# Patient Record
Sex: FEMALE | Race: OTHER | HISPANIC OR LATINO | URBAN - METROPOLITAN AREA
[De-identification: names, ages, dates, MRNs, and addresses within clinical notes are randomized per-mention and may not be internally consistent; named-entity substitution may affect disease eponyms.]

---

## 2017-11-06 ENCOUNTER — EMERGENCY (EMERGENCY)
Facility: HOSPITAL | Age: 37
LOS: 1 days | Discharge: HOME | End: 2017-11-06
Attending: EMERGENCY MEDICINE
Payer: SELF-PAY

## 2017-11-06 VITALS
SYSTOLIC BLOOD PRESSURE: 125 MMHG | DIASTOLIC BLOOD PRESSURE: 79 MMHG | TEMPERATURE: 98 F | OXYGEN SATURATION: 99 % | HEART RATE: 64 BPM | RESPIRATION RATE: 18 BRPM

## 2017-11-06 VITALS — WEIGHT: 136.91 LBS

## 2017-11-06 LAB
APPEARANCE UR: CLEAR — SIGNIFICANT CHANGE UP
BILIRUB UR-MCNC: NEGATIVE — SIGNIFICANT CHANGE UP
COLOR SPEC: SIGNIFICANT CHANGE UP
DIFF PNL FLD: NEGATIVE — SIGNIFICANT CHANGE UP
EPI CELLS # UR: SIGNIFICANT CHANGE UP
GLUCOSE UR QL: NEGATIVE MG/DL — SIGNIFICANT CHANGE UP
HCG UR QL: NEGATIVE — SIGNIFICANT CHANGE UP
KETONES UR-MCNC: NEGATIVE — SIGNIFICANT CHANGE UP
LEUKOCYTE ESTERASE UR-ACNC: NEGATIVE — SIGNIFICANT CHANGE UP
NITRITE UR-MCNC: NEGATIVE — SIGNIFICANT CHANGE UP
PH UR: 6.5 — SIGNIFICANT CHANGE UP (ref 5–8)
PROT UR-MCNC: NEGATIVE MG/DL — SIGNIFICANT CHANGE UP
RBC CASTS # UR COMP ASSIST: SIGNIFICANT CHANGE UP /HPF (ref 0–4)
SP GR SPEC: 1 — LOW (ref 1.01–1.02)
UROBILINOGEN FLD QL: NEGATIVE MG/DL — SIGNIFICANT CHANGE UP
WBC UR QL: SIGNIFICANT CHANGE UP

## 2017-11-06 PROCEDURE — 81001 URINALYSIS AUTO W/SCOPE: CPT

## 2017-11-06 PROCEDURE — 81025 URINE PREGNANCY TEST: CPT

## 2017-11-06 PROCEDURE — 99283 EMERGENCY DEPT VISIT LOW MDM: CPT | Mod: 25

## 2017-11-06 PROCEDURE — 99284 EMERGENCY DEPT VISIT MOD MDM: CPT

## 2017-11-06 PROCEDURE — 96372 THER/PROPH/DIAG INJ SC/IM: CPT

## 2017-11-06 RX ORDER — METHOCARBAMOL 500 MG/1
1 TABLET, FILM COATED ORAL
Qty: 12 | Refills: 0 | OUTPATIENT
Start: 2017-11-06 | End: 2017-11-10

## 2017-11-06 RX ORDER — KETOROLAC TROMETHAMINE 30 MG/ML
30 SYRINGE (ML) INJECTION ONCE
Qty: 0 | Refills: 0 | Status: DISCONTINUED | OUTPATIENT
Start: 2017-11-06 | End: 2017-11-06

## 2017-11-06 RX ORDER — METHOCARBAMOL 500 MG/1
500 TABLET, FILM COATED ORAL ONCE
Qty: 0 | Refills: 0 | Status: COMPLETED | OUTPATIENT
Start: 2017-11-06 | End: 2017-11-06

## 2017-11-06 RX ORDER — IBUPROFEN 200 MG
1 TABLET ORAL
Qty: 20 | Refills: 0 | OUTPATIENT
Start: 2017-11-06 | End: 2017-11-11

## 2017-11-06 RX ADMIN — Medication 30 MILLIGRAM(S): at 19:22

## 2017-11-06 RX ADMIN — METHOCARBAMOL 500 MILLIGRAM(S): 500 TABLET, FILM COATED ORAL at 19:21

## 2017-11-06 NOTE — ED STATDOCS - ATTENDING CONTRIBUTION TO CARE
I, James Martínez, performed the initial face to face bedside interview with this patient regarding history of present illness, review of symptoms and relevant past medical, social and family history.  I completed an independent physical examination.  I was the initial provider who evaluated this patient. I have signed out the follow up of any pending tests (i.e. labs, radiological studies) to the ACP.  I have communicated the patient’s plan of care and disposition with the ACP.  The history, relevant review of systems, past medical and surgical history, medical decision making, and physical examination was documented by the scribe in my presence and I attest to the accuracy of the documentation.

## 2017-11-06 NOTE — ED STATDOCS - CARE PLAN
Principal Discharge DX:	Cystitis  Instructions for follow-up, activity and diet:	Followup with Medical clinic as discussed

## 2017-11-06 NOTE — ED ADULT NURSE NOTE - OBJECTIVE STATEMENT
Pt c/o lower abdominal pain with burning urination and frequency and pt states her urine has a strong smell.  Pt denies fevers/chills. States similar symptoms in past and was diagnosed with UTI. Pt seen & evaluated by MD waiting for test results.

## 2017-11-06 NOTE — ED STATDOCS - OBJECTIVE STATEMENT
36 y/o F presents to ED c/o mid L flank pain x3 days. Associated sx include vomiting, diarrhea (4 episodes), urinary frequency, nocturia and malodorous urine. Pt states she has had similar sx in the past, about 2-3 month ago where she was given abx. Denies fever, chills, sick contacts, rash or any other complaints at this time.

## 2017-11-06 NOTE — ED STATDOCS - PROGRESS NOTE DETAILS
Pt UA are within normal limits and she admits to urinary frequency and urgency. Pt denies any fever and D/C in stable condition. F/U with Medical clinic as discussed.

## 2018-05-21 ENCOUNTER — APPOINTMENT (OUTPATIENT)
Dept: ANTEPARTUM | Facility: CLINIC | Age: 38
End: 2018-05-21
Payer: SELF-PAY

## 2018-05-21 ENCOUNTER — ASOB RESULT (OUTPATIENT)
Age: 38
End: 2018-05-21

## 2018-05-21 PROBLEM — Z00.00 ENCOUNTER FOR PREVENTIVE HEALTH EXAMINATION: Status: ACTIVE | Noted: 2018-05-21

## 2018-05-21 PROCEDURE — 76857 US EXAM PELVIC LIMITED: CPT

## 2018-05-21 PROCEDURE — 76830 TRANSVAGINAL US NON-OB: CPT

## 2018-07-06 NOTE — ED ADULT TRIAGE NOTE - NS ED TRIAGE AVPU SCALE
Alert-The patient is alert, awake and responds to voice. The patient is oriented to time, place, and person. The triage nurse is able to obtain subjective information. (3) no apparent problem

## 2018-07-09 ENCOUNTER — EMERGENCY (EMERGENCY)
Facility: HOSPITAL | Age: 38
LOS: 1 days | Discharge: HOME | End: 2018-07-09
Attending: EMERGENCY MEDICINE
Payer: SELF-PAY

## 2018-07-09 VITALS — HEIGHT: 60.63 IN | WEIGHT: 145.95 LBS

## 2018-07-09 VITALS
DIASTOLIC BLOOD PRESSURE: 68 MMHG | TEMPERATURE: 98 F | HEART RATE: 67 BPM | SYSTOLIC BLOOD PRESSURE: 102 MMHG | RESPIRATION RATE: 18 BRPM | OXYGEN SATURATION: 99 %

## 2018-07-09 LAB
ALBUMIN SERPL ELPH-MCNC: 4.5 G/DL — SIGNIFICANT CHANGE UP (ref 3.3–5.2)
ALP SERPL-CCNC: 59 U/L — SIGNIFICANT CHANGE UP (ref 40–120)
ALT FLD-CCNC: 19 U/L — SIGNIFICANT CHANGE UP
ANION GAP SERPL CALC-SCNC: 14 MMOL/L — SIGNIFICANT CHANGE UP (ref 5–17)
APPEARANCE UR: CLEAR — SIGNIFICANT CHANGE UP
AST SERPL-CCNC: 21 U/L — SIGNIFICANT CHANGE UP
BILIRUB SERPL-MCNC: 0.4 MG/DL — SIGNIFICANT CHANGE UP (ref 0.4–2)
BILIRUB UR-MCNC: ABNORMAL
BUN SERPL-MCNC: 16 MG/DL — SIGNIFICANT CHANGE UP (ref 8–20)
CALCIUM SERPL-MCNC: 9.6 MG/DL — SIGNIFICANT CHANGE UP (ref 8.6–10.2)
CHLORIDE SERPL-SCNC: 100 MMOL/L — SIGNIFICANT CHANGE UP (ref 98–107)
CK SERPL-CCNC: 145 U/L — SIGNIFICANT CHANGE UP (ref 25–170)
CO2 SERPL-SCNC: 22 MMOL/L — SIGNIFICANT CHANGE UP (ref 22–29)
COLOR SPEC: YELLOW — SIGNIFICANT CHANGE UP
CREAT SERPL-MCNC: 0.64 MG/DL — SIGNIFICANT CHANGE UP (ref 0.5–1.3)
DIFF PNL FLD: ABNORMAL
EOSINOPHIL # BLD AUTO: 0 K/UL — SIGNIFICANT CHANGE UP (ref 0–0.5)
EOSINOPHIL NFR BLD AUTO: 0 % — SIGNIFICANT CHANGE UP (ref 0–6)
EPI CELLS # UR: SIGNIFICANT CHANGE UP
GLUCOSE SERPL-MCNC: 125 MG/DL — HIGH (ref 70–115)
GLUCOSE UR QL: NEGATIVE MG/DL — SIGNIFICANT CHANGE UP
HCG SERPL-ACNC: <5 MIU/ML — SIGNIFICANT CHANGE UP
HCT VFR BLD CALC: 37.5 % — SIGNIFICANT CHANGE UP (ref 37–47)
HGB BLD-MCNC: 12.9 G/DL — SIGNIFICANT CHANGE UP (ref 12–16)
KETONES UR-MCNC: NEGATIVE — SIGNIFICANT CHANGE UP
LEUKOCYTE ESTERASE UR-ACNC: ABNORMAL
LYMPHOCYTES # BLD AUTO: 0.5 K/UL — LOW (ref 1–4.8)
LYMPHOCYTES # BLD AUTO: 13.5 % — LOW (ref 20–55)
MAGNESIUM SERPL-MCNC: 2.3 MG/DL — SIGNIFICANT CHANGE UP (ref 1.6–2.6)
MCHC RBC-ENTMCNC: 29.1 PG — SIGNIFICANT CHANGE UP (ref 27–31)
MCHC RBC-ENTMCNC: 34.4 G/DL — SIGNIFICANT CHANGE UP (ref 32–36)
MCV RBC AUTO: 84.5 FL — SIGNIFICANT CHANGE UP (ref 81–99)
MONOCYTES # BLD AUTO: 0.2 K/UL — SIGNIFICANT CHANGE UP (ref 0–0.8)
MONOCYTES NFR BLD AUTO: 6.1 % — SIGNIFICANT CHANGE UP (ref 3–10)
NEUTROPHILS # BLD AUTO: 2.9 K/UL — SIGNIFICANT CHANGE UP (ref 1.8–8)
NEUTROPHILS NFR BLD AUTO: 79.8 % — HIGH (ref 37–73)
NITRITE UR-MCNC: NEGATIVE — SIGNIFICANT CHANGE UP
PH UR: 5 — SIGNIFICANT CHANGE UP (ref 5–8)
PLATELET # BLD AUTO: 232 K/UL — SIGNIFICANT CHANGE UP (ref 150–400)
POTASSIUM SERPL-MCNC: 3.5 MMOL/L — SIGNIFICANT CHANGE UP (ref 3.5–5.3)
POTASSIUM SERPL-SCNC: 3.5 MMOL/L — SIGNIFICANT CHANGE UP (ref 3.5–5.3)
PROT SERPL-MCNC: 8 G/DL — SIGNIFICANT CHANGE UP (ref 6.6–8.7)
PROT UR-MCNC: 30 MG/DL
RBC # BLD: 4.44 M/UL — SIGNIFICANT CHANGE UP (ref 4.4–5.2)
RBC # FLD: 13.2 % — SIGNIFICANT CHANGE UP (ref 11–15.6)
RBC CASTS # UR COMP ASSIST: ABNORMAL /HPF (ref 0–4)
SODIUM SERPL-SCNC: 136 MMOL/L — SIGNIFICANT CHANGE UP (ref 135–145)
SP GR SPEC: 1.02 — SIGNIFICANT CHANGE UP (ref 1.01–1.02)
TROPONIN T SERPL-MCNC: <0.01 NG/ML — SIGNIFICANT CHANGE UP (ref 0–0.06)
UROBILINOGEN FLD QL: 1 MG/DL
WBC # BLD: 3.6 K/UL — LOW (ref 4.8–10.8)
WBC # FLD AUTO: 3.6 K/UL — LOW (ref 4.8–10.8)
WBC UR QL: SIGNIFICANT CHANGE UP

## 2018-07-09 PROCEDURE — 71045 X-RAY EXAM CHEST 1 VIEW: CPT | Mod: 26

## 2018-07-09 PROCEDURE — 80053 COMPREHEN METABOLIC PANEL: CPT

## 2018-07-09 PROCEDURE — 82550 ASSAY OF CK (CPK): CPT

## 2018-07-09 PROCEDURE — 36415 COLL VENOUS BLD VENIPUNCTURE: CPT

## 2018-07-09 PROCEDURE — 83735 ASSAY OF MAGNESIUM: CPT

## 2018-07-09 PROCEDURE — 99284 EMERGENCY DEPT VISIT MOD MDM: CPT | Mod: 25

## 2018-07-09 PROCEDURE — 99283 EMERGENCY DEPT VISIT LOW MDM: CPT

## 2018-07-09 PROCEDURE — 85027 COMPLETE CBC AUTOMATED: CPT

## 2018-07-09 PROCEDURE — T1013: CPT

## 2018-07-09 PROCEDURE — 71045 X-RAY EXAM CHEST 1 VIEW: CPT

## 2018-07-09 PROCEDURE — 84702 CHORIONIC GONADOTROPIN TEST: CPT

## 2018-07-09 PROCEDURE — 81001 URINALYSIS AUTO W/SCOPE: CPT

## 2018-07-09 PROCEDURE — 84484 ASSAY OF TROPONIN QUANT: CPT

## 2018-07-09 PROCEDURE — 99053 MED SERV 10PM-8AM 24 HR FAC: CPT

## 2018-07-09 RX ORDER — SODIUM CHLORIDE 9 MG/ML
2000 INJECTION INTRAMUSCULAR; INTRAVENOUS; SUBCUTANEOUS ONCE
Qty: 0 | Refills: 0 | Status: COMPLETED | OUTPATIENT
Start: 2018-07-09 | End: 2018-07-09

## 2018-07-09 RX ADMIN — SODIUM CHLORIDE 2000 MILLILITER(S): 9 INJECTION INTRAMUSCULAR; INTRAVENOUS; SUBCUTANEOUS at 10:34

## 2018-07-09 NOTE — ED ADULT TRIAGE NOTE - CHIEF COMPLAINT QUOTE
"I was at work and I passed out 3 times. " Pt states she hit her head first time, but not 2nd or 3rd time. Pt  had a Gatorade today but did not eat. LMP was 6/28/18

## 2018-07-09 NOTE — ED PROVIDER NOTE - OBJECTIVE STATEMENT
38 year old female with no significant PMh presents with syncope. pt reports that yesterday she was very nauseous, vomited x 1 and had diarrhea, and had poor PO intake. This morning she only drank some gatorade, but did not eat anything. 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 She states that she was caught by a co-worker and was lowered to the floor and did not hit her head. She states that she was unconscious for several minutes, no convulsions. She then went back to work, and states that she had two more episodes in which she felt lightheaded and like she was going to pass out, but did not lose consciousness a second time. No chest pain, palpitations, abd pain, vaginal bleeding, dysuria, HA, numbness, tingling, weakness.

## 2018-07-09 NOTE — ED PROVIDER NOTE - MEDICAL DECISION MAKING DETAILS
Pt states that she feels much ebtter and she tolerated PO here. Bilirubin in urine, yet LFTs and bilirubin in serum are normal and abd is completely soft and non-tender. Syncope was likely secondary to hypovolemia from AGE yesterday. Stable for dc

## 2020-06-26 ENCOUNTER — APPOINTMENT (OUTPATIENT)
Dept: MATERNAL FETAL MEDICINE | Facility: CLINIC | Age: 40
End: 2020-06-26

## 2020-06-26 ENCOUNTER — APPOINTMENT (OUTPATIENT)
Dept: ANTEPARTUM | Facility: CLINIC | Age: 40
End: 2020-06-26

## 2020-06-26 VITALS
RESPIRATION RATE: 18 BRPM | OXYGEN SATURATION: 98 % | SYSTOLIC BLOOD PRESSURE: 122 MMHG | WEIGHT: 159 LBS | HEIGHT: 62 IN | HEART RATE: 91 BPM | DIASTOLIC BLOOD PRESSURE: 74 MMHG | BODY MASS INDEX: 29.26 KG/M2

## 2020-06-26 DIAGNOSIS — Z98.51 TUBAL LIGATION STATUS: ICD-10-CM

## 2020-06-26 DIAGNOSIS — O09.529 SUPERVISION OF ELDERLY MULTIGRAVIDA, UNSPECIFIED TRIMESTER: ICD-10-CM

## 2020-06-26 DIAGNOSIS — Z31.69 ENCOUNTER FOR OTHER GENERAL COUNSELING AND ADVICE ON PROCREATION: ICD-10-CM

## 2021-09-08 NOTE — ED PROVIDER NOTE - CPE EDP HEME LYMPH NORM
Over the last 2 weeks, how often have you been bothered by the following problems?          PHQ2 Score: 0  PHQ2 Score Interpretation: No further screening needed  1. Little interest or pleasure in activity?: 0  2. Feeling down, depressed, or hopeless?: 0               CC:  Theresa J Schwabe is here today for Follow-up   on her recent Covid and XR, had her XR done yesterday     Medications have been reviewed and updated    Patient preferred phone number: [phone]  Ok to leave detailed message on [employer]    Health Maintenance Due   Topic Date Due   • COVID-19 Vaccine (1) Never done   • Shingles Vaccine (1 of 2) Never done   • Colorectal Cancer Screen-  Never done   • Influenza Vaccine (1) 09/01/2021       Patient is due for topics as listed above but is not proceeding with Immunization(s) COVID-19, Influenza and Shingles and Colorectal Cancer Screening: Colonoscopy at this time.       [facility] [address] [address] [employer] - [place] [place] - [address]  [address]  [place] [address]  Phone: [phone] Fax: [phone]       normal...

## 2021-09-24 ENCOUNTER — APPOINTMENT (OUTPATIENT)
Dept: ORTHOPEDIC SURGERY | Facility: CLINIC | Age: 41
End: 2021-09-24
Payer: MEDICAID

## 2021-09-24 VITALS
HEART RATE: 67 BPM | HEIGHT: 62 IN | DIASTOLIC BLOOD PRESSURE: 81 MMHG | WEIGHT: 159 LBS | SYSTOLIC BLOOD PRESSURE: 125 MMHG | BODY MASS INDEX: 29.26 KG/M2

## 2021-09-24 DIAGNOSIS — M47.816 SPONDYLOSIS W/OUT MYELOPATHY OR RADICULOPATHY, LUMBAR REGION: ICD-10-CM

## 2021-09-24 DIAGNOSIS — M06.9 RHEUMATOID ARTHRITIS, UNSPECIFIED: ICD-10-CM

## 2021-09-24 DIAGNOSIS — I10 ESSENTIAL (PRIMARY) HYPERTENSION: ICD-10-CM

## 2021-09-24 DIAGNOSIS — M51.36 OTHER INTERVERTEBRAL DISC DEGENERATION, LUMBAR REGION: ICD-10-CM

## 2021-09-24 PROCEDURE — 99072 ADDL SUPL MATRL&STAF TM PHE: CPT

## 2021-09-24 PROCEDURE — 72100 X-RAY EXAM L-S SPINE 2/3 VWS: CPT

## 2021-09-24 PROCEDURE — 99204 OFFICE O/P NEW MOD 45 MIN: CPT

## 2021-09-24 RX ORDER — MELOXICAM 15 MG/1
15 TABLET ORAL
Qty: 30 | Refills: 1 | Status: ACTIVE | COMMUNITY
Start: 2021-09-24 | End: 1900-01-01

## 2021-09-24 NOTE — HISTORY OF PRESENT ILLNESS
[de-identified] : 41 year old female with right sided low back pain intermittent x years but the last 2 months has been constant and more bothersome.  Spasms or quality, worse after a long day at work when she is standing for a long period time. Is not radiating down the legs and there is no change in bowel bladder, nolower extremity weakness.She has had one pain management injection did not help to relieve her pain but she has not had an physical therapy to date. Pain 7/10 on a regular basis, 5/10 after resting and taking ibuprofen.\par Past medical history is relevant for rheumatoid arthritis and well-controlled hypertension. She does not smoke. 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

## 2021-09-24 NOTE — DISCUSSION/SUMMARY
[Surgical risks reviewed] : Surgical risks reviewed [de-identified] : Physical therapy for decreased pain and increased function. Rheumatology consult polyarthralgias, local treatment of rheumatoid arthritis.She will bring the actual study of her recent MRI of her lumbar spine so that we  can give appropriate advice regarding the findings. medrol Dosepak for anti-inflammatory properties be followed by meloxicam 50 mg once ap.r.n. pain.Anticipatory guidance regarding degenerative disc disease, lumbar spondylosis was given and discussed the natural history of such with her at length. Proper lifting and bending.  possible custom orthotics as she does have a mild leg length discrepancy maybe helpful in decreasing her myositis-type back pain after long days standing at work.She will follow up in 4-6 weeks or p.r.n.with a copy of the CT of her recent lumbar MRI appear

## 2021-09-24 NOTE — PHYSICAL EXAM
[de-identified] : CONSTITUTIONAL: The patient is a very pleasant individual who is well-nourished and who appears stated age.\par PSYCHIATRIC: The patient is alert and oriented X 3 and in no apparent distress, and participates with orthopedic evaluation well.\par HEAD: Atraumatic and is nonsyndromic in appearance.\par EENT: No visible thyromegaly, EOMI.\par RESPIRATORY: Respiratory rate is regular, not dyspneic on examination.\par LYMPHATICS: There is no inguinal lymphadenopathy\par INTEGUMENTARY: Skin is clean, dry, and intact about the bilateral lower extremities and lumbar spine.\par VASCULAR: There is brisk capillary refill about the bilateral lower extremities.\par NEUROLOGIC: There are no pathologic reflexes. There is no decrease in sensation of the bilateral lower extremities on Wartenberg pinwheel/manual examination. Deep tendon reflexes are well maintained at 2+/4 of the bilateral lower extremities and are symmetric..\par MUSCULOSKELETAL: There is no visible muscular atrophy. Manual motor strength is well maintained in the bilateral lower extremities. Range of motion of lumbar spine is well maintained. The patient ambulates in a non-myelopathic manner. Negative tension sign and straight leg raise bilaterally. Quad extension, ankle dorsiflexion, EHL, plantar flexion, and ankle eversion are well preserved. Normal secondary orthopaedic exam of bilateral hips, greater trochanteric area, knees and ankles\par Positive reproducible right lumbar myositis [de-identified] : X-rays of the lumbar spine are reviewed at today's datethe lower lumbar degenerative disc disease as well as a transitional segment\par \par  she had an MRI at a private [facility]orthopedic office in [place] 3 weeks ago. We did look for her MRI in every radiology suite the we have access to but we do not have access to her MRI. She states she will bring about 90 visit.

## 2023-06-14 ENCOUNTER — EMERGENCY (EMERGENCY)
Facility: HOSPITAL | Age: 43
LOS: 1 days | Discharge: HOME | End: 2023-06-14
Attending: EMERGENCY MEDICINE
Payer: COMMERCIAL

## 2023-06-14 VITALS
HEART RATE: 67 BPM | DIASTOLIC BLOOD PRESSURE: 84 MMHG | RESPIRATION RATE: 17 BRPM | TEMPERATURE: 98 F | OXYGEN SATURATION: 97 % | SYSTOLIC BLOOD PRESSURE: 114 MMHG

## 2023-06-14 VITALS
HEART RATE: 66 BPM | TEMPERATURE: 98 F | SYSTOLIC BLOOD PRESSURE: 128 MMHG | WEIGHT: 139.99 LBS | OXYGEN SATURATION: 98 % | DIASTOLIC BLOOD PRESSURE: 81 MMHG | HEIGHT: 61 IN | RESPIRATION RATE: 20 BRPM

## 2023-06-14 LAB
ALBUMIN SERPL ELPH-MCNC: 4.6 G/DL — SIGNIFICANT CHANGE UP (ref 3.3–5.2)
ALP SERPL-CCNC: 55 U/L — SIGNIFICANT CHANGE UP (ref 40–120)
ALT FLD-CCNC: 13 U/L — SIGNIFICANT CHANGE UP
ANION GAP SERPL CALC-SCNC: 12 MMOL/L — SIGNIFICANT CHANGE UP (ref 5–17)
APTT BLD: 27 SEC — LOW (ref 27.5–35.5)
AST SERPL-CCNC: 19 U/L — SIGNIFICANT CHANGE UP
BASOPHILS # BLD AUTO: 0.02 K/UL — SIGNIFICANT CHANGE UP (ref 0–0.2)
BASOPHILS NFR BLD AUTO: 0.4 % — SIGNIFICANT CHANGE UP (ref 0–2)
BILIRUB SERPL-MCNC: <0.2 MG/DL — LOW (ref 0.4–2)
BUN SERPL-MCNC: 14 MG/DL — SIGNIFICANT CHANGE UP (ref 8–20)
CALCIUM SERPL-MCNC: 9 MG/DL — SIGNIFICANT CHANGE UP (ref 8.4–10.5)
CHLORIDE SERPL-SCNC: 105 MMOL/L — SIGNIFICANT CHANGE UP (ref 96–108)
CO2 SERPL-SCNC: 22 MMOL/L — SIGNIFICANT CHANGE UP (ref 22–29)
CREAT SERPL-MCNC: 0.57 MG/DL — SIGNIFICANT CHANGE UP (ref 0.5–1.3)
EGFR: 114 ML/MIN/1.73M2 — SIGNIFICANT CHANGE UP
EOSINOPHIL # BLD AUTO: 0.06 K/UL — SIGNIFICANT CHANGE UP (ref 0–0.5)
EOSINOPHIL NFR BLD AUTO: 1.2 % — SIGNIFICANT CHANGE UP (ref 0–6)
GLUCOSE SERPL-MCNC: 82 MG/DL — SIGNIFICANT CHANGE UP (ref 70–99)
HCG SERPL-ACNC: <4 MIU/ML — SIGNIFICANT CHANGE UP
HCT VFR BLD CALC: 34.2 % — LOW (ref 34.5–45)
HGB BLD-MCNC: 11.6 G/DL — SIGNIFICANT CHANGE UP (ref 11.5–15.5)
IMM GRANULOCYTES NFR BLD AUTO: 0.4 % — SIGNIFICANT CHANGE UP (ref 0–0.9)
INR BLD: 1.02 RATIO — SIGNIFICANT CHANGE UP (ref 0.88–1.16)
LYMPHOCYTES # BLD AUTO: 1.96 K/UL — SIGNIFICANT CHANGE UP (ref 1–3.3)
LYMPHOCYTES # BLD AUTO: 38 % — SIGNIFICANT CHANGE UP (ref 13–44)
MAGNESIUM SERPL-MCNC: 2.5 MG/DL — SIGNIFICANT CHANGE UP (ref 1.8–2.6)
MCHC RBC-ENTMCNC: 28.6 PG — SIGNIFICANT CHANGE UP (ref 27–34)
MCHC RBC-ENTMCNC: 33.9 GM/DL — SIGNIFICANT CHANGE UP (ref 32–36)
MCV RBC AUTO: 84.4 FL — SIGNIFICANT CHANGE UP (ref 80–100)
MONOCYTES # BLD AUTO: 0.34 K/UL — SIGNIFICANT CHANGE UP (ref 0–0.9)
MONOCYTES NFR BLD AUTO: 6.6 % — SIGNIFICANT CHANGE UP (ref 2–14)
NEUTROPHILS # BLD AUTO: 2.76 K/UL — SIGNIFICANT CHANGE UP (ref 1.8–7.4)
NEUTROPHILS NFR BLD AUTO: 53.4 % — SIGNIFICANT CHANGE UP (ref 43–77)
PLATELET # BLD AUTO: 270 K/UL — SIGNIFICANT CHANGE UP (ref 150–400)
POTASSIUM SERPL-MCNC: 3.6 MMOL/L — SIGNIFICANT CHANGE UP (ref 3.5–5.3)
POTASSIUM SERPL-SCNC: 3.6 MMOL/L — SIGNIFICANT CHANGE UP (ref 3.5–5.3)
PROT SERPL-MCNC: 7.2 G/DL — SIGNIFICANT CHANGE UP (ref 6.6–8.7)
PROTHROM AB SERPL-ACNC: 11.8 SEC — SIGNIFICANT CHANGE UP (ref 10.5–13.4)
RBC # BLD: 4.05 M/UL — SIGNIFICANT CHANGE UP (ref 3.8–5.2)
RBC # FLD: 13.2 % — SIGNIFICANT CHANGE UP (ref 10.3–14.5)
SODIUM SERPL-SCNC: 138 MMOL/L — SIGNIFICANT CHANGE UP (ref 135–145)
TROPONIN T SERPL-MCNC: <0.01 NG/ML — SIGNIFICANT CHANGE UP (ref 0–0.06)
WBC # BLD: 5.16 K/UL — SIGNIFICANT CHANGE UP (ref 3.8–10.5)
WBC # FLD AUTO: 5.16 K/UL — SIGNIFICANT CHANGE UP (ref 3.8–10.5)

## 2023-06-14 PROCEDURE — 99223 1ST HOSP IP/OBS HIGH 75: CPT

## 2023-06-14 PROCEDURE — 70450 CT HEAD/BRAIN W/O DYE: CPT | Mod: 26,MA

## 2023-06-14 PROCEDURE — 70551 MRI BRAIN STEM W/O DYE: CPT | Mod: 26,MA

## 2023-06-14 PROCEDURE — 71046 X-RAY EXAM CHEST 2 VIEWS: CPT | Mod: 26

## 2023-06-14 PROCEDURE — 93010 ELECTROCARDIOGRAM REPORT: CPT

## 2023-06-14 PROCEDURE — 72125 CT NECK SPINE W/O DYE: CPT | Mod: 26,MA

## 2023-06-14 NOTE — ED CDU PROVIDER INITIAL DAY NOTE - CLINICAL SUMMARY MEDICAL DECISION MAKING FREE TEXT BOX
42yo female with no pmh presents with LUE weakness. Pt states that since Sunday with LUE weakness and paresthesias to left face x 3 days . with tingling to the left 3,4th and 5th finger .  pt states she feels her arm is weak when she tries to pick something up. Pt denies paresthesias/numbness/pain to arm states it's weak when trying to use it. Pt denies fevers/chills, ha, loc, cp/sob/palp, cough, abd pain/n/v/d, urinary symptoms, recent travel and sick contacts. likely radiculopathy less likely stroke    MRI brain  stroke protocol   neurocheck Q4 hrs

## 2023-06-14 NOTE — ED ADULT NURSE NOTE - NSFALLUNIVINTERV_ED_ALL_ED
Bed/Stretcher in lowest position, wheels locked, appropriate side rails in place/Call bell, personal items and telephone in reach/Instruct patient to call for assistance before getting out of bed/chair/stretcher/Non-slip footwear applied when patient is off stretcher/[place] to call system/Physically safe environment - no spills, clutter or unnecessary equipment/Purposeful proactive rounding/Room/bathroom lighting operational, light cord in reach

## 2023-06-14 NOTE — ED CDU PROVIDER INITIAL DAY NOTE - ATTENDING APP SHARED VISIT CONTRIBUTION OF CARE
I agree with the PA's note and was available for any issues/concerns. I was directly involved in patient care. My brief overall assessment is as follows:    43 year old female no PMHx c/o LUE weakness; initial work up reviewed; admit to obs for MRI

## 2023-06-14 NOTE — ED ADULT NURSE REASSESSMENT NOTE - NS ED NURSE REASSESS COMMENT FT1
Assumed care of pt at 19:15 as stated in report from VENECIA ansari. Charting as noted. Patient A&O x4, denies pain/discomfort, denies CP/SOB. Updated on the plan of care. Call bell within reach, bed locked in lowest position. IV site flushed w/ NS. No redness, swelling or pain noted to site. No signs of acute distress noted, safety maintained. CT pending.
care assumed from VENECIA Stewart. patient to MRI. awaiting . will examine when patient returns from MRI.
care assumed from VENECIA Stewart. patient AAO x 4, GCS 15, NIH 0. on obs pending MRI result. patient back from MRI.  Arnaud used to speak with patient. patient endorses some decreased sensation to fingers, but better than before.. tele monitoring in use. bed in lowest position, call bell within reach.

## 2023-06-14 NOTE — ED PROVIDER NOTE - CLINICAL SUMMARY MEDICAL DECISION MAKING FREE TEXT BOX
42yo female with no pmh presents with LUE weakness. 42yo female with no pmh presents with LUE weakness. Pt with no acute findings on CT, will keep in obs for MRI

## 2023-06-14 NOTE — ED CDU PROVIDER INITIAL DAY NOTE - OBJECTIVE STATEMENT
44yo female with no pmh presents with LUE weakness. Pt states that since Sunday with LUE weakness and paresthesias to left face x 3 days . with tingling to the left 3,4th and 5th finger .  pt states she feels her arm is weak when she tries to pick something up. Pt denies paresthesias/numbness/pain to arm states it's weak when trying to use it. Pt denies fevers/chills, ha, loc, cp/sob/palp, cough, abd pain/n/v/d, urinary symptoms, recent travel and sick contacts.

## 2023-06-14 NOTE — ED PROVIDER NOTE - PHYSICAL EXAMINATION
Const: Awake, alert and oriented. In no acute distress. Well appearing.  HEENT: NC/AT. Moist mucous membranes.  Eyes: No scleral icterus. EOMI.  Neck:. Soft and supple. Full ROM without pain.  Cardiac: Regular rate and regular rhythm. +S1/S2. Peripheral pulses 2+ and symmetric. No LE edema.  Resp: Speaking in full sentences. No evidence of respiratory distress. No wheezes, rales or rhonchi.  Abd: Soft, non-tender, non-distended. Normal bowel sounds in all 4 quadrants. No guarding or rebound.  Back: Spine midline and non-tender. No CVAT.  Skin: No rashes, abrasions or lacerations.  Lymph: No cervical lymphadenopathy.  Neuro: A&Ox3, moving all extremities symmetrically, follows commands, motor geo upper and lower ext 5/5, sensory symm and intact CN 2-12 grossly intact, no ataxia, no nystagmus, no dysmetria, no ddk, symm geo, no pronator drift

## 2023-06-14 NOTE — ED ADULT NURSE NOTE - OBJECTIVE STATEMENT
pt reports to the ED c/o left facial pain along with numbness and tingling to left extremity. pt states symptoms started on sunday. decreased sensation and strength to left side. no facial droop noted. pt reports blurry vision in both eyes. pt reports 7/10 pain on 1-10 pain rating scale. IV placed, labs sent, placed on cardiac monitor.

## 2023-06-14 NOTE — ED PROVIDER NOTE - OBJECTIVE STATEMENT
42yo female with no pmh presents with LUE weakness. Pt states that since Sunday with LUE weakness and paresthesias to left face. pt states she feels her arm is weak when she tries to pick something up. Pt denies paresthesias/numbness/pain to arm states it's weak when trying to use it. Pt denies fevers/chills, ha, loc, cp/sob/palp, cough, abd pain/n/v/d, urinary symptoms, recent travel and sick contacts.

## 2023-06-14 NOTE — ED ADULT NURSE REASSESSMENT NOTE - NSFALLUNIVINTERV_ED_ALL_ED
Bed/Stretcher in lowest position, wheels locked, appropriate side rails in place/Call bell, personal items and telephone in reach/Instruct patient to call for assistance before getting out of bed/chair/stretcher/Non-slip footwear applied when patient is off stretcher/[place] to call system/Physically safe environment - no spills, clutter or unnecessary equipment/Purposeful proactive rounding/Room/bathroom lighting operational, light cord in reach

## 2023-06-14 NOTE — ED CDU PROVIDER INITIAL DAY NOTE - PHYSICAL EXAMINATION
Const: Awake, alert and oriented. In no acute distress. Well appearing.  HEENT: NC/AT. Moist mucous membranes.  Eyes: No scleral icterus. EOMI.  Neck:. Soft and supple. Full ROM without pain.  Cardiac: Regular rate and regular rhythm. +S1/S2.. No LE edema.  Resp: Speaking in full sentences. No evidence of respiratory distress. No wheezes, rales or rhonchi.  Abd: Soft, non-tender, non-distended. Normal bowel sounds in all 4 quadrants. No guarding or rebound.  Back: Spine midline and non-tender. No CVAT.  Skin: No rashes, abrasions or lacerations.  Neuro: A&Ox3, moving all extremities symmetrically, follows commands, motor geo upper and lower ext 5/5, sensory symm and intact CN 2-12 grossly intact, no ataxia, no nystagmus, no dysmetria, no ddk, symm geo, no pronator drift

## 2023-06-15 PROCEDURE — 70450 CT HEAD/BRAIN W/O DYE: CPT | Mod: MA

## 2023-06-15 PROCEDURE — 82962 GLUCOSE BLOOD TEST: CPT

## 2023-06-15 PROCEDURE — 72125 CT NECK SPINE W/O DYE: CPT | Mod: MA

## 2023-06-15 PROCEDURE — 85025 COMPLETE CBC W/AUTO DIFF WBC: CPT

## 2023-06-15 PROCEDURE — 71046 X-RAY EXAM CHEST 2 VIEWS: CPT

## 2023-06-15 PROCEDURE — 85730 THROMBOPLASTIN TIME PARTIAL: CPT

## 2023-06-15 PROCEDURE — G0378: CPT

## 2023-06-15 PROCEDURE — 99285 EMERGENCY DEPT VISIT HI MDM: CPT | Mod: 25

## 2023-06-15 PROCEDURE — 84484 ASSAY OF TROPONIN QUANT: CPT

## 2023-06-15 PROCEDURE — 80053 COMPREHEN METABOLIC PANEL: CPT

## 2023-06-15 PROCEDURE — 70551 MRI BRAIN STEM W/O DYE: CPT | Mod: MA

## 2023-06-15 PROCEDURE — 83735 ASSAY OF MAGNESIUM: CPT

## 2023-06-15 PROCEDURE — 36415 COLL VENOUS BLD VENIPUNCTURE: CPT

## 2023-06-15 PROCEDURE — 84702 CHORIONIC GONADOTROPIN TEST: CPT

## 2023-06-15 PROCEDURE — 85610 PROTHROMBIN TIME: CPT

## 2023-06-15 PROCEDURE — T1013: CPT

## 2023-06-15 PROCEDURE — 93005 ELECTROCARDIOGRAM TRACING: CPT

## 2023-06-15 NOTE — ED CDU PROVIDER DISPOSITION NOTE - NSFOLLOWUPINSTRUCTIONS_ED_ALL_ED_FT
[place] Motrin sin receta según lo necesite para el dolor.  Llame y delicia un seguimiento con atención primaria y neurología nand también  Radiculopatía cervical  Cervical Radiculopathy   La radiculopatía cervical quiere decir que un nervio del cherrie (nervio cervical) está comprimido o dañado. Lemitar puede ocurrir debido a kena lesión en la columna vertebral cervical (vértebras) del cherrie, o neema parte normal del envejecimiento. Lemitar puede causar dolor o pérdida de la sensibilidad (adormecimiento) que comienza en el cherrie y va hasta el brazo y los dedos. Con frecuencia, esta afección mejora con reposo. Augusto vez sea necesario administrar un tratamiento si la afección no mejora.  ¿Cuáles son las causas?  Lesión en el cherrie.  Abombamiento discal en la columna vertebral.  Movimientos musculares que no puede controlar (espasmos musculares).  Tensión de los músculos del cherrie debido al uso excesivo.  Artritis.  Fractura de los huesos y las articulaciones de la columna (espondiloartrosis) debido al envejecimiento.  Espolones óseos que se mesfin cerca de los nervios del cherrie.    ¿Cuáles son los signos o los síntomas?  Dolor. El dolor puede tener las siguientes características:    Va desde el cherrie hasta el brazo y la mano.  Es muy intenso o irritante.  Empeora al  el cherrie.  Pérdida de la sensibilidad o adormecimiento en el brazo o la mano.  Debilidad en el brazo o la mano, en casos muy graves.    ¿Cómo se trata?  En muchos casos, no se requiere tratamiento para esta afección. Con reposo, esta generalmente mejora con el tiempo. Si es necesario administrar tratamiento, las opciones pueden incluir lo siguiente:    El uso de un collarín cervical blando cely períodos cortos, neema se lo haya indicado el médico.  Realizar ejercicios (fisioterapia) para fortalecer los músculos del cherrie.  Solitario medicamentos.  Aplicarse inyecciones en la columna vertebral, en casos muy graves.  Someterse a kena cirugía. Lemitar puede ser necesario si otros tratamientos no son eficaces. El tipo de cirugía que se realiza depende de la causa de la afección.    Siga estas instrucciones en reese casa:      Si tiene un collarín cervical blando:    Úselo neema se lo haya indicado el médico. Quíteselo solamente neema se lo haya indicado el médico.  Pregúntele al médico si puede quitarse el collarín para bañarse o higienizarse. Si lo autorizan a quitarse el collarín para bañarse o higienizarse:    Siga las instrucciones del médico sobre cómo quitarse el collarín de forma lora.  Para limpiar el collarín, pásele un paño con agua y jabón suave, y séquelo khanh.  Retire las almohadillas desmontables del collarín, si las tiene, cada 1 o 2 días. Lávelas a mano con agua y jabón. Déjelas que se sequen por completo antes de volver a ponerlas en el collarín.  Contrólese la piel debajo del collarín para chiara si hay enrojecimiento o llagas. Avísele al médico si ve algo de lo anterior.        Control del dolor      [place] los medicamentos de venta erasmo y los recetados solamente neema se lo haya indicado el médico.  Si se lo indican, aplique hielo sobre la eros dolorida.    Si tiene un collarín cervical blando, quíteselo neema se lo haya indicado el médico.  Ponga el hielo en kena bolsa plástica.  Coloque kena toalla entre la piel y la bolsa.  Coloque el hielo cely 20 minutos, 2 a 3 veces por día.  Si aplicarse hielo no le elieser el dolor, intente aplicarse calor. Use la daniella de calor que el médico le recomiende, neema kena compresa de calor húmedo o kena almohadilla térmica.    Coloque kena toalla entre la piel y la daniella de calor.  Aplique calor cely 20 a 30 minutos.  Retire la daniella de calor si la piel se pone de color ford brillante. Lemitar es muy importante si no puede sentir dolor, calor o frío. Puede correr un riesgo mayor de sufrir quemaduras.  Puede intentar con un masaje suave en el cherrie y los hombros.        Actividad    Descanse todo lo que sea necesario.  Retome armen actividades normales según lo indicado por el médico. Pregúntele al médico qué actividades son seguras para usted.  Delicia ejercicios neema se lo hayan indicado el médico o el fisioterapeuta.  No levante nada que pese más de 10 lb (4.5 kg) hasta que el médico le diga que puede hacerlo sin correr riesgos.        Instrucciones generales    Use kena almohada plana para dormir.  No conduzca vehículos mientras usa el collarín cervical blando. Si no tiene un collarín cervical blando, pregúntele al médico si puede conducir sin correr riesgos mientras se yesenia reese cherrie.  Pregúntele al médico si el medicamento recetado le impide conducir o usar maquinaria pesada.  No consuma ningún producto que contenga nicotina o tabaco, neema cigarrillos, cigarrillos electrónicos y tabaco de mascar. Estos pueden retrasar la recuperación. Si necesita ayuda para dejar de fumar, consulte al médico.  Concurra a todas las visitas de seguimiento neema se lo haya indicado el médico. Lemitar es importante.    Comuníquese con un médico si:  La afección no mejora con el tratamiento.    Solicite ayuda inmediatamente si:  El dolor empeora y no se elieser con medicamentos.  Pierde la sensibilidad o siente debilidad en la mano, el brazo, el ceci o la pierna.  Tiene fiebre velvet.  Presenta rigidez en el cherrie.  No puede controlar la evacuación de materia fecal o de pis (tiene incontinencia).  Tiene dificultad para caminar, mantener el equilibrio o hablar.    Resumen  La radiculopatía cervical quiere decir que un nervio del cherrie está comprimido o dañado.  Un nervio puede pinzarse por un abultamiento de disco, artritis, kena lesión en el cherrie u otras causas.  Los síntomas incluyen dolor, hormigueo o pérdida de la sensibilidad que va desde el cherrie hasta el brazo o la mano.  En casos muy graves, puede presentarse debilidad en el brazo o la mano.  El tratamiento puede incluir reposo, usar un collarín cervical blando y hacer ejercicios. Es posible que necesite solitario medicamentos para el dolor. En casos muy graves, podría ser necesario aplicarse inyecciones o someterse a kena cirugía.    NOTAS ADICIONALES E INSTRUCCIONES    Please follow up with your Primary MD in 24-48 hr.  Seek immediate medical care for any new/worsening signs or symptoms.

## 2023-06-15 NOTE — ED CDU PROVIDER DISPOSITION NOTE - CLINICAL COURSE
42yo female with no pmh presents with LUE weakness. Pt states that since Sunday with LUE weakness and paresthesias to left face x 3 days . with tingling to the left 3,4th and 5th finger .  pt states she feels her arm is weak when she tries to pick something up. Pt denies paresthesias/numbness/pain to arm states it's weak when trying to use it. Pt denies fevers/chills, ha, loc, cp/sob/palp, cough, abd pain/n/v/d, urinary symptoms, recent travel and sick contacts.- pt MRI negative explained finding on MRI and labs - pt states her PCP given her the referral for neurology  told take motrin and f,u or follow up with our neuro- pt agreed with plan / basilio

## 2023-06-15 NOTE — ED CDU PROVIDER DISPOSITION NOTE - NS ED ATTENDING STATEMENT MOD
This was a shared visit with the KATARZYNA. I reviewed and verified the documentation and independently performed the documented: Attending with

## 2023-06-15 NOTE — ED CDU PROVIDER DISPOSITION NOTE - PATIENT PORTAL LINK FT
You can access the FollowMyHealth Patient Portal offered by [facility] by registering at the following website: [email or web address]. By joining [email or web address]’s FollowMyHealth portal, you will also be able to view your health information using other applications (apps) compatible with our system.

## 2023-06-15 NOTE — ED CDU PROVIDER DISPOSITION NOTE - ATTENDING CONTRIBUTION TO CARE
Pt placed on OBS for MR for further w/u of facial numbness and LUE weakness.  MR neg.  Pt stable for d/c with Neurology f/u as outpt  neuropathy

## 2023-06-15 NOTE — ED CDU PROVIDER DISPOSITION NOTE - CARE PROVIDER_API CALL
Flako Zabala  Neurology  [address] [place] [address], [facility] [address]  [place] [address]  Phone: [phone]  Fax: [phone]  Follow Up Time:

## 2023-10-24 ENCOUNTER — APPOINTMENT (OUTPATIENT)
Dept: NEUROLOGY | Facility: CLINIC | Age: 43
End: 2023-10-24
Payer: MEDICAID

## 2023-10-24 VITALS
SYSTOLIC BLOOD PRESSURE: 128 MMHG | DIASTOLIC BLOOD PRESSURE: 80 MMHG | BODY MASS INDEX: 28.34 KG/M2 | HEIGHT: 62 IN | WEIGHT: 154 LBS

## 2023-10-24 DIAGNOSIS — M54.12 RADICULOPATHY, CERVICAL REGION: ICD-10-CM

## 2023-10-24 PROCEDURE — 99204 OFFICE O/P NEW MOD 45 MIN: CPT

## 2023-10-24 RX ORDER — GABAPENTIN 300 MG/1
300 CAPSULE ORAL
Qty: 60 | Refills: 5 | Status: ACTIVE | COMMUNITY
Start: 2023-10-24 | End: 1900-01-01

## 2023-10-24 RX ORDER — METHYLPREDNISOLONE 4 MG/1
4 TABLET ORAL
Qty: 1 | Refills: 0 | Status: COMPLETED | COMMUNITY
Start: 2021-09-24 | End: 2023-10-24

## 2023-10-24 RX ORDER — NORTRIPTYLINE HYDROCHLORIDE 25 MG/1
25 CAPSULE ORAL
Refills: 0 | Status: COMPLETED | COMMUNITY
End: 2023-10-24

## 2023-12-18 ENCOUNTER — APPOINTMENT (OUTPATIENT)
Dept: ANTEPARTUM | Facility: CLINIC | Age: 43
End: 2023-12-18
Payer: MEDICAID

## 2023-12-18 ENCOUNTER — ASOB RESULT (OUTPATIENT)
Age: 43
End: 2023-12-18

## 2023-12-18 PROCEDURE — 76856 US EXAM PELVIC COMPLETE: CPT | Mod: 59

## 2023-12-18 PROCEDURE — 76830 TRANSVAGINAL US NON-OB: CPT

## 2023-12-21 ENCOUNTER — APPOINTMENT (OUTPATIENT)
Dept: NEUROLOGY | Facility: CLINIC | Age: 43
End: 2023-12-21

## 2024-01-23 ENCOUNTER — APPOINTMENT (OUTPATIENT)
Dept: NEUROLOGY | Facility: CLINIC | Age: 44
End: 2024-01-23
Payer: MEDICAID

## 2024-01-23 PROCEDURE — 95909 NRV CNDJ TST 5-6 STUDIES: CPT

## 2024-01-23 PROCEDURE — 95886 MUSC TEST DONE W/N TEST COMP: CPT

## 2024-04-04 ENCOUNTER — APPOINTMENT (OUTPATIENT)
Dept: NEUROLOGY | Facility: CLINIC | Age: 44
End: 2024-04-04